# Patient Record
Sex: MALE | Race: WHITE | Employment: FULL TIME | URBAN - METROPOLITAN AREA
[De-identification: names, ages, dates, MRNs, and addresses within clinical notes are randomized per-mention and may not be internally consistent; named-entity substitution may affect disease eponyms.]

---

## 2018-11-05 ENCOUNTER — APPOINTMENT (OUTPATIENT)
Dept: GENERAL RADIOLOGY | Facility: CLINIC | Age: 68
End: 2018-11-05
Attending: EMERGENCY MEDICINE
Payer: OTHER MISCELLANEOUS

## 2018-11-05 ENCOUNTER — HOSPITAL ENCOUNTER (EMERGENCY)
Facility: CLINIC | Age: 68
Discharge: HOME | End: 2018-11-05
Attending: EMERGENCY MEDICINE | Admitting: EMERGENCY MEDICINE
Payer: OTHER MISCELLANEOUS

## 2018-11-05 VITALS
TEMPERATURE: 97.6 F | BODY MASS INDEX: 24.38 KG/M2 | OXYGEN SATURATION: 97 % | RESPIRATION RATE: 18 BRPM | DIASTOLIC BLOOD PRESSURE: 86 MMHG | WEIGHT: 180 LBS | HEIGHT: 72 IN | SYSTOLIC BLOOD PRESSURE: 134 MMHG

## 2018-11-05 DIAGNOSIS — S43.005A SHOULDER DISLOCATION, LEFT, INITIAL ENCOUNTER: Primary | ICD-10-CM
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
## 2018-11-05 PROCEDURE — 99152 MOD SED SAME PHYS/QHP 5/>YRS: CPT

## 2018-11-05 PROCEDURE — 96374 THER/PROPH/DIAG INJ IV PUSH: CPT

## 2018-11-05 PROCEDURE — 23650 CLTX SHO DSLC W/MNPJ WO ANES: CPT | Mod: LT

## 2018-11-05 PROCEDURE — 40000986 XR SHOULDER 2 VIEW LEFT: Mod: LT

## 2018-11-05 PROCEDURE — 99285 EMERGENCY DEPT VISIT HI MDM: CPT | Mod: 25

## 2018-11-05 PROCEDURE — 73030 X-RAY EXAM OF SHOULDER: CPT | Mod: LT

## 2018-11-05 PROCEDURE — 96361 HYDRATE IV INFUSION ADD-ON: CPT

## 2018-11-05 PROCEDURE — 73060 X-RAY EXAM OF HUMERUS: CPT | Mod: LT

## 2018-11-05 PROCEDURE — 25000128 H RX IP 250 OP 636: Performed by: EMERGENCY MEDICINE

## 2018-11-05 PROCEDURE — 71045 X-RAY EXAM CHEST 1 VIEW: CPT

## 2018-11-05 PROCEDURE — 96376 TX/PRO/DX INJ SAME DRUG ADON: CPT

## 2018-11-05 RX ORDER — NALOXONE HYDROCHLORIDE 0.4 MG/ML
.1-.4 INJECTION, SOLUTION INTRAMUSCULAR; INTRAVENOUS; SUBCUTANEOUS
Status: DISCONTINUED | OUTPATIENT
Start: 2018-11-05 | End: 2018-11-06 | Stop reason: HOSPADM

## 2018-11-05 RX ORDER — HYDROMORPHONE HYDROCHLORIDE 1 MG/ML
0.5 INJECTION, SOLUTION INTRAMUSCULAR; INTRAVENOUS; SUBCUTANEOUS
Status: COMPLETED | OUTPATIENT
Start: 2018-11-05 | End: 2018-11-05

## 2018-11-05 RX ORDER — PROPOFOL 10 MG/ML
.5-1 INJECTION, EMULSION INTRAVENOUS ONCE
Status: DISCONTINUED | OUTPATIENT
Start: 2018-11-05 | End: 2018-11-06 | Stop reason: HOSPADM

## 2018-11-05 RX ORDER — PROPOFOL 10 MG/ML
0.1 INJECTION, EMULSION INTRAVENOUS
Status: DISCONTINUED | OUTPATIENT
Start: 2018-11-05 | End: 2018-11-06 | Stop reason: HOSPADM

## 2018-11-05 RX ORDER — HYDROCODONE BITARTRATE AND ACETAMINOPHEN 5; 325 MG/1; MG/1
1 TABLET ORAL EVERY 6 HOURS PRN
Qty: 10 TABLET | Refills: 0 | Status: SHIPPED | OUTPATIENT
Start: 2018-11-05

## 2018-11-05 RX ORDER — HYDROCODONE BITARTRATE AND ACETAMINOPHEN 5; 325 MG/1; MG/1
1 TABLET ORAL EVERY 6 HOURS PRN
Qty: 10 TABLET | Refills: 0 | Status: SHIPPED | OUTPATIENT
Start: 2018-11-05 | End: 2018-11-05

## 2018-11-05 RX ORDER — IBUPROFEN 600 MG/1
600 TABLET, FILM COATED ORAL EVERY 8 HOURS PRN
Qty: 30 TABLET | Refills: 0 | Status: SHIPPED | OUTPATIENT
Start: 2018-11-05

## 2018-11-05 RX ORDER — IBUPROFEN 600 MG/1
600 TABLET, FILM COATED ORAL EVERY 8 HOURS PRN
Qty: 30 TABLET | Refills: 0 | Status: SHIPPED | OUTPATIENT
Start: 2018-11-05 | End: 2018-11-05

## 2018-11-05 RX ADMIN — SODIUM CHLORIDE 1000 ML: 9 INJECTION, SOLUTION INTRAVENOUS at 19:53

## 2018-11-05 RX ADMIN — HYDROMORPHONE HYDROCHLORIDE 0.5 MG: 1 INJECTION, SOLUTION INTRAMUSCULAR; INTRAVENOUS; SUBCUTANEOUS at 20:03

## 2018-11-05 RX ADMIN — HYDROMORPHONE HYDROCHLORIDE 0.5 MG: 1 INJECTION, SOLUTION INTRAMUSCULAR; INTRAVENOUS; SUBCUTANEOUS at 20:36

## 2018-11-05 RX ADMIN — HYDROMORPHONE HYDROCHLORIDE 0.5 MG: 1 INJECTION, SOLUTION INTRAMUSCULAR; INTRAVENOUS; SUBCUTANEOUS at 19:54

## 2018-11-05 ASSESSMENT — ENCOUNTER SYMPTOMS
HEADACHES: 0
ARTHRALGIAS: 1

## 2018-11-05 NOTE — ED AVS SNAPSHOT
Emergency Department    [address] [place] [address]    Phone:  [phone]    Fax:  [phone]                                       Thanh Jackson   MRN: [id number]    Department:   Emergency Department   Date of Visit:  11/5/2018           After Visit Summary Signature Page     I have received my discharge instructions, and my questions have been answered. I have discussed any challenges I see with this plan with the nurse or doctor.    ..........................................................................................................................................  Patient/Patient Representative Signature      ..........................................................................................................................................  Patient Representative Print Name and Relationship to Patient    ..................................................               ................................................  Date                                   Time    ..........................................................................................................................................  Reviewed by Signature/Title    ...................................................              ..............................................  Date                                               Time          22EPIC Rev 08/18

## 2018-11-05 NOTE — ED AVS SNAPSHOT
Emergency Department    [address] [facility] [address]    Phone:  [phone]    Fax:  [phone]                                       Thanh Jackson   MRN: [id number]    Department:   Emergency Department   Date of Visit:  11/5/2018           Patient Information     Date Of Birth          1950        Your diagnoses for this visit were:     Shoulder dislocation, left, initial encounter     Fall, initial encounter        You were seen by Elton Killian MD.      Follow-up Information     Follow up with Orthopaedics, [facility]. Schedule an appointment as soon as possible for a visit in 1 week.    Contact information:    [address] [address]  [phone]          Follow up with  Emergency Department.    Specialty:  EMERGENCY MEDICINE    Why:  If symptoms worsen    Contact information:    [address] [facility] [id number] [phone]        Discharge Instructions           Dislocation: Shoulder (Reduced)    A shoulder is dislocated when a strong force injures, and possibly tears the ligaments that hold the shoulder joint together. The bones that make up the joint then move apart and become stuck out of place. The joint must be put back in place. Then it will take several weeks for the shoulder to heal. This injury may weaken the ligaments. Weakened ligaments put you at risk for another dislocation. Another dislocation can happen even if you are hit with less force.  Shoulder dislocation is treated with a special type of arm sling called a shoulder immobilizer. This keeps your arm close to your body. This stops the shoulder from dislocating again while the ligaments heal. After a few weeks, you may start an exercise program. This will gradually bring back your range-of-motion and shoulder strength. It will also lower your risk for another dislocation.  Home care  Follow these tips for taking care of yourself at home:    Until your next doctor  visit, wear your shoulder immobilizer at all times. Don t take it off at night to sleep. This is because it s possible to dislocate your arm again in your sleep. You can take it off to bathe or dress. But don t move your arm away from your body. Keep your arm in the same position that the sling was holding it in until you put the sling back on. During your next visit, ask your doctor how long you should wear the sling.    Apply an ice pack over the injured area for 20 minutes every 1 to 2 hours the first day. You can make an ice pack by putting ice cubes in a plastic bag. Wrap the bag in a towel before putting it on your shoulder. Continue with ice packs 3 to 4 times a day for the next 2 to 3 days. Then use the ice as needed to relieve pain and swelling.    You may use acetaminophen or ibuprofen to control pain, unless another pain medicine was prescribed. If you have chronic liver or kidney disease or ever had a stomach ulcer or gastrointestinal bleeding, talk with your doctor before using these medicines.    Don t take part in sports or physical education classes until your doctor says it s OK.  Follow-up care  Follow up with your healthcare provider, or as advised. You shouldn t wear your shoulder immobilizer or sling for more than a few weeks without taking it off. Keeping it on for a longer time may limit your range-of-motion at the shoulder joint. If you have had several dislocations of the same shoulder, you may have permanent damage to the ligaments. Ask an orthopedic doctor about surgery to prevent another dislocation.  When to seek medical advice  Call your healthcare provider right away if any of these occur:    Another dislocation of your shoulder    Swelling or pain in the shoulder or arm that gets worse    Your fingers become cold, blue, numb or tingly    Fever or chills  Date Last Reviewed: 8/2/2016 2000-2018 [employer]. [address] [place] [address], [place] [address]. All rights reserved.  This information is not intended as a substitute for professional medical care. Always follow your healthcare professional's instructions.      Discharge Instructions  Procedural Sedation    During your visit today you had Procedural Sedation which is the process used to give you medications in order to make you comfortable or relaxed while a painful or difficult procedure is performed. This might have been a wound repair, fracture reduction ( setting a broken bone ), relocation of a dislocated joint, or other procedure.    The medications used for Procedural Sedation are generally very short-acting so you are being discharged at a time when it is most likely that the medications have completely left your body. It is possible that the medication could cause some persistent symptoms like nausea, drowsiness, dizziness, clumsiness/poor balance, or poor judgment. As a result, please do not drive, operate machinery/tools, or do anything else that requires judgment or coordination for the rest of the day. This could include climbing ladders or other heights, swimming/bathing, exercising, bicycling, or other similar activities. If you are feeling back to normal, you may resume normal activities the following day.    Generally, every Emergency Department visit should have a follow-up clinic visit with either a primary or a specialty clinic/provider. Please follow-up as instructed by your emergency provider today.  Return to the Emergency Department right away if:    You have difficulty breathing.    Your friends or family feel that you are too sleepy/sedated.    You have repeated vomiting.    Home care:    Do not drink alcohol or take sedating (sleeping) medications.    Take pain medications only as instructed by your provider.    Begin with a light diet (like clear liquids) and add more foods as your stomach tolerates.    If you have vomiting, return to clear liquids.    Follow-up:    Follow-up was recommended based on the  condition that you received Procedural Sedation for; follow-up according to the instructions you received from your provider today.      If you were given a prescription for medicine here today, be sure to read all of the information (including the package insert) that comes with your prescription. This will include important information about the medicine, its side effects, and any warnings that you need to know about. The pharmacist who fills the prescription can provide more information and answer questions you may have about the medicine.  If you have questions or concerns that the pharmacist cannot address, please call or return to the Emergency Department.     Remember that you can always come back to the Emergency Department if you are not able to see your regular provider in the amount of time listed above, if you get any new symptoms, or if there is anything that worries you.    Opioid Medication Information    You have been given a prescription for an opioid (narcotic) pain medicine and/or have received a pain medicine while here in the Emergency Department. These medicines can make you drowsy or impaired. You must not drive, operate dangerous equipment, or engage in any other dangerous activities while taking these medications. If you drive while taking these medications, you could be arrested for driving under the influence (DUI). Do not drink any alcohol while you are taking these medications.     Opioid pain medications can cause addiction. If you have a history of chemical dependency of any type, you are at a higher risk of becoming addicted to pain medications.  Only take these prescribed medications to treat your pain when all other options have been tried. Take it for as short a time and as few doses as possible. Store your pain pills in a secure place, as they are frequently stolen and provide a dangerous opportunity for children or visitors in your house to start abusing these powerful  medications. We will not replace any lost or stolen medicine.    If you do not finish your medication, it is a good idea to get rid of it but please do not flush it down the toilet. Please dispose of the remaining medication at a local pharmacy or law enforcement facility. The [place] Pollution [employer] Agency has additional information on medication disposal: https://[email or web address].[email or web address]/living-green/managing-unwanted-medications.      Many prescription pain medications contain Tylenol  (acetaminophen), including Vicodin , Tylenol #3 , Norco , Lortab , and Percocet .  You should not take any extra pills of Tylenol  if you are using these prescription medications or you can get very sick.  Do not ever take more than 3000 mg of acetaminophen in any 24 hour period.    All opioids tend to cause constipation. Drink plenty of water and eat foods that have a lot of fiber, such as fruits, vegetables, prune juice, apple juice and high fiber cereal.  Take a laxative if you don t move your bowels at least every other day. Miralax , Milk of Magnesia, Colace , or Senna  can be used to keep you regular.          24 Hour Appointment Hotline       To make an appointment at any [facility], call [phone] ([phone]). If you don't have a family doctor or clinic, we will help you find one. [place] clinics are conveniently located to serve the needs of you and your family.             Review of your medicines      START taking        Dose / Directions Last dose taken    HYDROcodone-acetaminophen 5-325 MG per tablet   Commonly known as:  NORCO   Dose:  1 tablet   Quantity:  10 tablet        Take 1 tablet by mouth every 6 hours as needed for severe pain   Refills:  0        ibuprofen 600 MG tablet   Commonly known as:  ADVIL/MOTRIN   Dose:  600 mg   Quantity:  30 tablet        Take 1 tablet (600 mg) by mouth every 8 hours as needed for moderate pain   Refills:  0          Our records show that you are taking the  medicines listed below. If these are incorrect, please call your family doctor or clinic.        Dose / Directions Last dose taken    erythromycin ophthalmic ointment   Commonly known as:  ROMYCIN   Dose:  0.5 inch   Quantity:  1 g        Place 0.5 inches into the right eye 3 times daily   Refills:  0                Information about OPIOIDS     PRESCRIPTION OPIOIDS: WHAT YOU NEED TO KNOW   We gave you an opioid (narcotic) pain medicine. It is important to manage your pain, but opioids are not always the best choice. You should first try all the other options your care team gave you. Take this medicine for as short a time (and as few doses) as possible.    Some activities can increase your pain, such as bandage changes or therapy sessions. It may help to take your pain medicine 30 to 60 minutes before these activities. Reduce your stress by getting enough sleep, working on hobbies you enjoy and practicing relaxation or meditation. Talk to your care team about ways to manage your pain beyond prescription opioids.    These medicines have risks:    DO NOT drive when on new or higher doses of pain medicine. These medicines can affect your alertness and reaction times, and you could be arrested for driving under the influence (DUI). If you need to use opioids long-term, talk to your care team about driving.    DO NOT operate heavy machinery    DO NOT do any other dangerous activities while taking these medicines.    DO NOT drink any alcohol while taking these medicines.     If the opioid prescribed includes acetaminophen, DO NOT take with any other medicines that contain acetaminophen. Read all labels carefully. Look for the word  acetaminophen  or  Tylenol.  Ask your pharmacist if you have questions or are unsure.    You can get addicted to pain medicines, especially if you have a history of addiction (chemical, alcohol or substance dependence). Talk to your care team about ways to reduce this risk.    All opioids tend  to cause constipation. Drink plenty of water and eat foods that have a lot of fiber, such as fruits, vegetables, prune juice, apple juice and high-fiber cereal. Take a laxative (Miralax, milk of magnesia, Colace, Senna) if you don t move your bowels at least every other day. Other side effects include upset stomach, sleepiness, dizziness, throwing up, tolerance (needing more of the medicine to have the same effect), physical dependence and slowed breathing.    Store your pills in a secure place, locked if possible. We will not replace any lost or stolen medicine. If you don t finish your medicine, please throw away (dispose) as directed by your pharmacist. The [place] Pollution Control Agency has more information about safe disposal: https://[email or web address]/living-green/managing-unwanted-medications        Prescriptions were sent or printed at these locations (2 Prescriptions)                   Other Prescriptions                Printed at Department/Unit printer (2 of 2)         HYDROcodone-acetaminophen (NORCO) 5-325 MG per tablet               ibuprofen (ADVIL/MOTRIN) 600 MG tablet                Procedures and tests performed during your visit     Procedure/Test Number of Times Performed    Cardiac Continuous Monitoring 1    End tidal CO2 Monitoring 1    Humerus XR,  G/E 2 views, left 1    XR Chest 1 View 1    XR Shoulder Left 2 Views 2      Orders Needing Specimen Collection     None      Pending Results     Date and Time Order Name Status Description    11/5/2018 2113 XR Shoulder Left 2 Views Preliminary             Pending Culture Results     No orders found from 11/3/2018 to 11/6/2018.            Pending Results Instructions     If you had any lab results that were not finalized at the time of your Discharge, you can call the ED Lab Result RN at [phone]. You will be contacted by this team for any positive Lab results or changes in treatment. The nurses are available 7 days a week from 10A to  6:30P.  You can leave a message 24 hours per day and they will return your call.        Test Results From Your Hospital Stay              11/5/2018  9:39 PM      Narrative     LEFT HUMERUS TWO OR MORE VIEWS     11/5/2018 8:34 PM     HISTORY: Left arm pain after fall.    COMPARISON: None.        Impression     IMPRESSION: Anterior subcoracoid dislocation of the shoulder. No  evidence of acute fracture.     CARLA WREN MD               11/5/2018  9:39 PM      Narrative     LEFT SHOULDER TWO VIEWS     11/5/2018 8:32 PM     HISTORY: Left shoulder dislocation after fall.  Concern for fracture.       COMPARISON: None.        Impression     IMPRESSION: Anterior subcoracoid dislocation of the shoulder. No  definite fracture identified. Probable marginal osteophyte formation  in the glenoid.      CARLA WREN MD         11/5/2018  8:46 PM      Narrative     XR CHEST ONE VIEW   11/5/2018 8:36 PM     HISTORY: Fall, left shoulder pain.    COMPARISON: None.        Impression     IMPRESSION: Left shoulder dislocation is present. Refer to shoulder  report for additional details. Lungs are well-inflated and clear.  Heart size is normal.    VINCENT FELDMAN MD               11/5/2018  9:45 PM      Narrative     XR LEFT SHOULDER TWO VIEWS  11/5/2018 9:36 PM     HISTORY: Post reduction.    COMPARISON: None.        Impression     IMPRESSION: Two views demonstrate the humeral head properly located in  the glenoid fossa. No definite fracture.                Clinical Quality Measure: Blood Pressure Screening     Your blood pressure was checked while you were in the emergency department today. The last reading we obtained was  BP: 130/85 . Please read the guidelines below about what these numbers mean and what you should do about them.  If your systolic blood pressure (the top number) is less than 120 and your diastolic blood pressure (the bottom number) is less than 80, then your blood pressure is normal. There is nothing more that you  "need to do about it.  If your systolic blood pressure (the top number) is 120-139 or your diastolic blood pressure (the bottom number) is 80-89, your blood pressure may be higher than it should be. You should have your blood pressure rechecked within a year by a primary care provider.  If your systolic blood pressure (the top number) is 140 or greater or your diastolic blood pressure (the bottom number) is 90 or greater, you may have high blood pressure. High blood pressure is treatable, but if left untreated over time it can put you at risk for heart attack, stroke, or kidney failure. You should have your blood pressure rechecked by a primary care provider within the next 4 weeks.  If your provider in the emergency department today gave you specific instructions to follow-up with your doctor or provider even sooner than that, you should follow that instruction and not wait for up to 4 weeks for your follow-up visit.        Thank you for choosing [facility]       Thank you for choosing [facility] for your care. Our goal is always to provide you with excellent care. Hearing back from our patients is one way we can continue to improve our services. Please take a few minutes to complete the written survey that you may receive in the mail after you visit with us. Thank you!        [employer] Information     [employer] lets you send messages to your doctor, view your test results, renew your prescriptions, schedule appointments and more. To sign up, go to [email or web address] . Click on \"Log in\" on the left side of the screen, which will take you to the Welcome page. Then click on \"Sign up Now\" on the right side of the page.     You will be asked to enter the access code listed below, as well as some personal information. Please follow the directions to create your username and password.     Your access code is: [email or web address]  Expires: 2/3/2019 10:20 PM     Your access code will  in 90 days. If you need help or a new code, " please call your [facility] clinic or [phone].        Care EveryWhere ID     This is your Care EveryWhere ID. This could be used by other organizations to access your [facility] medical records  [id number]        Equal Access to Services     ARMOND RAWLS : Lul Last, waaxda luqadaha, qaybta kaalmada ailyn, vicki dugan. So [facility] [phone].    ATENCIÓN: Si habla español, tiene a lauren disposición servicios gratuitos de asistencia lingüística. Llame al [phone].    We comply with applicable federal civil rights laws and [place] laws. We do not discriminate on the basis of race, color, national origin, age, disability, sex, sexual orientation, or gender identity.            After Visit Summary       This is your record. Keep this with you and show to your community pharmacist(s) and doctor(s) at your next visit.

## 2018-11-06 NOTE — ED NOTES
Bed: ST03  Expected date: 11/5/18  Expected time: 8:07 PM  Means of arrival:   Comments:  Room 2 reduction

## 2018-11-06 NOTE — ED PROVIDER NOTES
History     Chief Complaint:  Arm Pain    HPI   Thanh Jackson is a 68 year old male who presents for evaluation of left arm pain. 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 He stepped across iron railings and slipped, falling forward landing on his left shoulder and collarbone. Denies hitting his head, no loss of consciousness. He feels that his fingers on his left hand are cold, his thumb is numb. Denies injuries to the ankles, knees, or hips. No blood thinner use. Last food was around 1730. Rotator cuff surgery in the past to his right shoulder.    Allergies:  No Known Drug Allergies      Medications:    The patient denies any significant past medical history.     Past Medical History:    The patient denies any significant past medical history.     Past Surgical History:    The patient does not have any pertinent past surgical history.     Family History:    No past pertinent family history.     Social History:  Injury occurred at work for [employer]    Review of Systems   Musculoskeletal: Positive for arthralgias (left shoulder/arm).   Neurological: Negative for syncope and headaches.   All other systems reviewed and are negative.    Physical Exam     Patient Vitals for the past 24 hrs:   BP Temp Temp src Heart Rate Resp SpO2 Height Weight   11/05/18 2210 134/86 - - 72 18 97 % - -   11/05/18 2140 130/85 - - 82 - 96 % - -   11/05/18 2115 (!) 144/97 - - - - 98 % - -   11/05/18 2100 (!) 182/120 - - 86 - - - -   11/05/18 2045 (!) 175/132 - - 87 12 - - -   11/05/18 2030 (!) 167/112 - - - 11 - - -   11/05/18 2004 (!) 137/103 - - 75 24 95 % - -   11/05/18 1927 102/65 97.6  F (36.4  C) Oral 65 16 94 % 1.829 m (6') 81.6 kg (180 lb)        Physical Exam  General: Alert, appears well-developed and well-nourished. Cooperative.     In moderate distress  HEENT:  Head:  Atraumatic  Ears:  External ears are normal  Mouth/Throat:  Oropharynx is without erythema  or exudate and mucous membranes are moist.   Eyes:   Conjunctivae normal and EOM are normal. No scleral icterus.  Neck:   Normal range of motion. Neck supple.  CV:  Normal rate, regular rhythm, normal heart sounds and radial pulses are 2+ and symmetric.  No murmur.  Resp:  Breath sounds are clear bilaterally    Non-labored, no retractions or accessory muscle use  GI:  Abdomen is soft, no distension, no tenderness. No rebound or guarding.  No CVA tenderness bilaterally  MS:  Normal range of motion. No edema.    Left Shoulder:    The Clavicle is intact clinically    The AC joint is non-tender    The Glenohumeral Joint is abnormal, dislocation with humeral head anteriorly dislocated.    The proximal humerus is tender    The mid and distal shaft of the humerus are non-tender    Axillary nerve function is intact    Brachial and Radial Artery pulse is normal    Numbness to distal left upper extremity with significant pain to gentle touch of hand.  No wrist pain or elbow pain.  No forearm pain in LUE.  Weak hand  due to pain of left upper arm and suspected dislocation.  Skin:  Warm and dry.  No rash or lesions noted.  Neuro: Alert. Normal strength.  GCS: 15  Psych:  Normal mood and affect.    Emergency Department Course     Imaging:  Radiology findings were communicated with the patient who voiced understanding of the findings.    XR Chest 1 View  IMPRESSION: Left shoulder dislocation is present. Refer to shoulder  report for additional details. Lungs are well-inflated and clear.  Heart size is normal.  Reading per radiology.    XR Shoulder Left 2 Views  IMPRESSION: Anterior subcoracoid dislocation of the shoulder. No  definite fracture identified. Probable marginal osteophyte formation  in the glenoid.    Reading per radiology.    Humerus XR, G/E 2 Views  IMPRESSION: Anterior subcoracoid dislocation of the shoulder. No  evidence of acute fracture.   Reading per radiology.    XR Shoulder Left 2 Views  IMPRESSION: Two  views demonstrate the humeral head properly located in  the glenoid fossa. No definite fracture.  Reading per radiology.     Procedures:    Sedation:      Performed by: Elton Killian MD  Authorized by: Elton Killian MD    Pre-Procedure Assessment done at 1743.    Expected Level:  Deep Sedation    Indication:  Sedation is required to allow for joint reduction    Consent obtained from patient after discussing the risks, benefits and alternatives.    PO Intake:  Appropriately NPO for procedure    ASA Class:  Class 1 - HEALTHY PATIENT    Mallampati:  Grade 2:  Soft palate, base of uvula, tonsillar pillars, and portion of posterior pharyngeal wall visible    Lungs: Lungs Clear with good breath sounds bilaterally.     Heart: Normal heart sounds and rate    History and physical reviewed and no updates needed. I have reviewed the lab findings, diagnostic data, medications, and the plan for sedation. I have determined this patient to be an appropriate candidate for the planned sedation and procedure and have reassessed the patient IMMEDIATELY PRIOR to sedation and procedure.      Sedation Post Procedure Summary:    Prior to the start of the procedure and with procedural staff participation, I verbally confirmed the patient s identity using two indicators, relevant allergies, that the procedure was appropriate and matched the consent or emergent situation, and that the correct equipment/implants were available. Immediately prior to starting the procedure I conducted the Time Out with the procedural staff and re-confirmed the patient s name, procedure, and site/side. (The Joint Commission universal protocol was followed.)  Yes      Sedatives: Propofol    Vital signs, airway, End Tidal CO2 and pulse oximetry were monitored and remained stable throughout the procedure and sedation was maintained until the procedure was complete.  The patient was monitored by staff until sedation discharge criteria were met.    Patient tolerance:  Patient tolerated the procedure well with no immediate complications.    Time of sedation in minutes:  10 minutes from beginning to end of physician one to one monitoring.         Narrative: Shoulder Reduction       INDICATION: anterior shoulder dislocation      LOCATION: Left shoulder      CONSENT: The patient was verbally & written consent regarding the risks, benefits of the procedure.      ANESTHESIA:  Propofol        TECHNIQUE: Gentle downward traction was applied to the upper the arm, with adduction. The arm was then externally rotated to 90 , and the shoulder was abducted fully. The arm was then gently pronated and brought down to the patient's side. With completion of this, the glenohumeral joint was now in an anatomical position. The patient tolerated the procedure well without difficulty.  There were no complications.        PROCEDURE: Shoulder Immobilizer Placement.     An left shoulder immobilizer was applied to the left upper extremity and after placement I checked and adjusted the fit to ensure proper positioning.  The patient was more comfortable with the sling in place.  Sensation and circulation are intact after sling placement.       Interventions:  1953 Normal Saline 1000 mL IV   2036 Dilaudid 0.5 mg IV     Emergency Department Course:  Nursing notes and vitals reviewed.  The patient was sent for a XR while in the emergency department, results above.      1937 I performed an exam of the patient as documented above.  1943 Pre-sedation assessment performed.  2101 Reduction procedure performer, see above note.     Findings and plan explained to the patient. Patient discharged home with instructions regarding supportive care, medications, and reasons to return. The importance of close follow-up was reviewed.      I personally reviewed the laboratory results with the patient and answered all related questions prior to discharge.    Impression & Plan      Medical Decision Making:  Thanh Jackson is a  68 year old male who presented with an acute anterior, inferior glenohumeral joint dislocation, confirmed on x-ray.  There is no evidence as above of neurovascular compromise.  The joint was reduced as above. He was provided a shoulder immobilizer after reduction and has good pain relief. Post-reduction x-ray shows no complication.  I discussed the natural history of shoulder dislocation, the possibility of underlying soft tissue injury, and precautions against recurrent dislocation. I have prescribed him oral pain medications and advised him to return for recurrent dislocation, worse pain, numbness, or any other concerns. I recommended orthopedic follow-up in 3-5 days.    Diagnosis:    ICD-10-CM    1. Shoulder dislocation, left, initial encounter S43.005A    2. Fall, initial encounter W19.XXXA       Disposition:   Discharged to home      Discharge Medications:  Discharge Medication List as of 11/5/2018 10:20 PM      START taking these medications    Details   HYDROcodone-acetaminophen (NORCO) 5-325 MG per tablet Take 1 tablet by mouth every 6 hours as needed for severe pain, Disp-10 tablet, R-0, Local Print      ibuprofen (ADVIL/MOTRIN) 600 MG tablet Take 1 tablet (600 mg) by mouth every 8 hours as needed for moderate pain, Disp-30 tablet, R-0, Local Print             Scribe Disclosure:  I, Donna Dsouza, am serving as a scribe at 7:37 PM on 11/5/2018 to document services personally performed by Elton Killian MD, based on my observations and the provider's statements to me.      Donna Dsouza  11/5/2018    EMERGENCY DEPARTMENT       Elton Killian MD  11/06/18 0026

## 2018-11-06 NOTE — DISCHARGE INSTRUCTIONS
Dislocation: Shoulder (Reduced)    A shoulder is dislocated when a strong force injures, and possibly tears the ligaments that hold the shoulder joint together. The bones that make up the joint then move apart and become stuck out of place. The joint must be put back in place. Then it will take several weeks for the shoulder to heal. This injury may weaken the ligaments. Weakened ligaments put you at risk for another dislocation. Another dislocation can happen even if you are hit with less force.  Shoulder dislocation is treated with a special type of arm sling called a shoulder immobilizer. This keeps your arm close to your body. This stops the shoulder from dislocating again while the ligaments heal. After a few weeks, you may start an exercise program. This will gradually bring back your range-of-motion and shoulder strength. It will also lower your risk for another dislocation.  Home care  Follow these tips for taking care of yourself at home:    Until your next doctor visit, wear your shoulder immobilizer at all times. Don t take it off at night to sleep. This is because it s possible to dislocate your arm again in your sleep. You can take it off to bathe or dress. But don t move your arm away from your body. Keep your arm in the same position that the sling was holding it in until you put the sling back on. During your next visit, ask your doctor how long you should wear the sling.    Apply an ice pack over the injured area for 20 minutes every 1 to 2 hours the first day. You can make an ice pack by putting ice cubes in a plastic bag. Wrap the bag in a towel before putting it on your shoulder. Continue with ice packs 3 to 4 times a day for the next 2 to 3 days. Then use the ice as needed to relieve pain and swelling.    You may use acetaminophen or ibuprofen to control pain, unless another pain medicine was prescribed. If you have chronic liver or kidney disease or ever had a stomach ulcer or  gastrointestinal bleeding, talk with your doctor before using these medicines.    Don t take part in sports or physical education classes until your doctor says it s OK.  Follow-up care  Follow up with your healthcare provider, or as advised. You shouldn t wear your shoulder immobilizer or sling for more than a few weeks without taking it off. Keeping it on for a longer time may limit your range-of-motion at the shoulder joint. If you have had several dislocations of the same shoulder, you may have permanent damage to the ligaments. Ask an orthopedic doctor about surgery to prevent another dislocation.  When to seek medical advice  Call your healthcare provider right away if any of these occur:    Another dislocation of your shoulder    Swelling or pain in the shoulder or arm that gets worse    Your fingers become cold, blue, numb or tingly    Fever or chills  Date Last Reviewed: 8/2/2016 2000-2018 [email or web address] [address] [place] [address]. All rights reserved. This information is not intended as a substitute for professional medical care. Always follow your healthcare professional's instructions.      Discharge Instructions  Procedural Sedation    During your visit today you had Procedural Sedation which is the process used to give you medications in order to make you comfortable or relaxed while a painful or difficult procedure is performed. This might have been a wound repair, fracture reduction ( setting a broken bone ), relocation of a dislocated joint, or other procedure.    The medications used for Procedural Sedation are generally very short-acting so you are being discharged at a time when it is most likely that the medications have completely left your body. It is possible that the medication could cause some persistent symptoms like nausea, drowsiness, dizziness, clumsiness/poor balance, or poor judgment. As a result, please do not drive, operate machinery/tools, or do  anything else that requires judgment or coordination for the rest of the day. This could include climbing ladders or other heights, swimming/bathing, exercising, bicycling, or other similar activities. If you are feeling back to normal, you may resume normal activities the following day.    Generally, every Emergency Department visit should have a follow-up clinic visit with either a primary or a specialty clinic/provider. Please follow-up as instructed by your emergency provider today.  Return to the Emergency Department right away if:    You have difficulty breathing.    Your friends or family feel that you are too sleepy/sedated.    You have repeated vomiting.    Home care:    Do not drink alcohol or take sedating (sleeping) medications.    Take pain medications only as instructed by your provider.    Begin with a light diet (like clear liquids) and add more foods as your stomach tolerates.    If you have vomiting, return to clear liquids.    Follow-up:    Follow-up was recommended based on the condition that you received Procedural Sedation for; follow-up according to the instructions you received from your provider today.      If you were given a prescription for medicine here today, be sure to read all of the information (including the package insert) that comes with your prescription. This will include important information about the medicine, its side effects, and any warnings that you need to know about. The pharmacist who fills the prescription can provide more information and answer questions you may have about the medicine.  If you have questions or concerns that the pharmacist cannot address, please call or return to the Emergency Department.     Remember that you can always come back to the Emergency Department if you are not able to see your regular provider in the amount of time listed above, if you get any new symptoms, or if there is anything that worries you.    Opioid Medication  Information    You have been given a prescription for an opioid (narcotic) pain medicine and/or have received a pain medicine while here in the Emergency Department. These medicines can make you drowsy or impaired. You must not drive, operate dangerous equipment, or engage in any other dangerous activities while taking these medications. If you drive while taking these medications, you could be arrested for driving under the influence (DUI). Do not drink any alcohol while you are taking these medications.     Opioid pain medications can cause addiction. If you have a history of chemical dependency of any type, you are at a higher risk of becoming addicted to pain medications.  Only take these prescribed medications to treat your pain when all other options have been tried. Take it for as short a time and as few doses as possible. Store your pain pills in a secure place, as they are frequently stolen and provide a dangerous opportunity for children or visitors in your house to start abusing these powerful medications. We will not replace any lost or stolen medicine.    If you do not finish your medication, it is a good idea to get rid of it but please do not flush it down the toilet. Please dispose of the remaining medication at a local pharmacy or law enforcement facility. The [place] [employer] has additional information on medication disposal: https://www.pca.[facility].[place].us/living-green/managing-unwanted-medications.      Many prescription pain medications contain Tylenol  (acetaminophen), including Vicodin , Tylenol #3 , Norco , Lortab , and Percocet .  You should not take any extra pills of Tylenol  if you are using these prescription medications or you can get very sick.  Do not ever take more than 3000 mg of acetaminophen in any 24 hour period.    All opioids tend to cause constipation. Drink plenty of water and eat foods that have a lot of fiber, such as fruits, vegetables, prune juice, apple  juice and high fiber cereal.  Take a laxative if you don t move your bowels at least every other day. Miralax , Milk of Magnesia, Colace , or Senna  can be used to keep you regular.